# Patient Record
Sex: FEMALE | Race: WHITE
[De-identification: names, ages, dates, MRNs, and addresses within clinical notes are randomized per-mention and may not be internally consistent; named-entity substitution may affect disease eponyms.]

---

## 2018-10-12 ENCOUNTER — HOSPITAL ENCOUNTER (EMERGENCY)
Dept: HOSPITAL 47 - EC | Age: 48
Discharge: HOME | End: 2018-10-12
Payer: COMMERCIAL

## 2018-10-12 VITALS
RESPIRATION RATE: 18 BRPM | HEART RATE: 75 BPM | DIASTOLIC BLOOD PRESSURE: 67 MMHG | TEMPERATURE: 98.1 F | SYSTOLIC BLOOD PRESSURE: 127 MMHG

## 2018-10-12 DIAGNOSIS — Z88.5: ICD-10-CM
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
## 2018-10-12 DIAGNOSIS — S69.81XA: Primary | ICD-10-CM
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
## 2018-10-12 DIAGNOSIS — Z77.21: ICD-10-CM

## 2018-10-12 LAB — HBV SURFACE AB SERPL IA-ACNC: 7.7 MIU/ML

## 2018-10-12 PROCEDURE — 36415 COLL VENOUS BLD VENIPUNCTURE: CPT

## 2018-10-12 PROCEDURE — 86706 HEP B SURFACE ANTIBODY: CPT

## 2018-10-12 PROCEDURE — 86803 HEPATITIS C AB TEST: CPT

## 2018-10-12 PROCEDURE — 87390 HIV-1 AG IA: CPT

## 2018-10-12 PROCEDURE — 99283 EMERGENCY DEPT VISIT LOW MDM: CPT

## 2018-10-12 NOTE — ED
General Adult HPI





- General


Chief complaint: Needlestick/Exposure


Stated complaint: needle stick,[facility]


Time Seen by Provider: 10/12/18 12:49


Source: patient, RN notes reviewed


Mode of arrival: ambulatory


Limitations: no limitations





- History of Present Illness


Initial comments: 





48-year-old female presents emergency Department chief complaint of 

needlestick.  Patient states that she was putting back 4 x 4 gauze into a bag.  

Patient states he was an open needle.  She states this is not from her today.  

She is unsure how long this has been in there.  It did strike her right hand 

third digit.  Patient states her tetanus is up-to-date.  There was minimal 

bleeding.





- Related Data


 Allergies











Allergy/AdvReac Type Severity Reaction Status Date / Time


 


codeine Allergy  Unknown Verified 10/12/18 12:27














Review of Systems


ROS Statement: 


Those systems with pertinent positive or pertinent negative responses have been 

documented in the HPI.





ROS Other: All systems not noted in ROS Statement are negative.





Past Medical History


Past Medical History: No Reported History


History of Any Multi-Drug Resistant Organisms: None Reported


Past Surgical History: No Surgical Hx Reported


Past Psychological History: No Psychological Hx Reported


Smoking Status: Never smoker


Past Alcohol Use History: Rare


Past Drug Use History: None Reported





General Exam


Limitations: no limitations


General appearance: alert, in no apparent distress


Respiratory exam: Present: normal lung sounds bilaterally.  Absent: respiratory 

distress, wheezes, rales, rhonchi, stridor


Cardiovascular Exam: Present: regular rate, normal rhythm, normal heart sounds.

  Absent: systolic murmur, diastolic murmur, rubs, gallop, clicks


Skin exam: Present: other (Puncture wound noted to the right hand third digit 

no active bleeding full range of motion)





Course





 Vital Signs











  10/12/18





  12:24


 


Temperature 98.1 F


 


Pulse Rate 75


 


Respiratory 18





Rate 


 


Blood Pressure 127/67


 


O2 Sat by Pulse 98





Oximetry 














Medical Decision Making





- Medical Decision Making





48-year-old female presented emergency department for needle stick.  This 

appears to be an old needle.  There was some bleeding.  Tetanus is up-to-date.  

I did discuss prophylactic HIV though this is quite at this time rapid HIV and 

hepatitis screening will be performed.





Disposition


Clinical Impression: 


 Needle stick injury





Disposition: HOME SELF-CARE


Condition: Stable


Instructions:  Needle Stick Injuries (ED)


Additional Instructions: 


Please return to the Emergency Department if symptoms worsen or any other 

concerns.


Is patient prescribed a controlled substance at d/c from ED?: No


Referrals: 


Nonstaff,Physician [Primary Care Provider] - 1-2 days


Time of Disposition: 13:02